# Patient Record
Sex: FEMALE | Race: WHITE | NOT HISPANIC OR LATINO | URBAN - NONMETROPOLITAN AREA
[De-identification: names, ages, dates, MRNs, and addresses within clinical notes are randomized per-mention and may not be internally consistent; named-entity substitution may affect disease eponyms.]

---

## 2017-01-18 ENCOUNTER — OFFICE (OUTPATIENT)
Dept: URBAN - NONMETROPOLITAN AREA CLINIC 13 | Facility: CLINIC | Age: 62
End: 2017-01-18
Payer: COMMERCIAL

## 2017-01-18 VITALS
WEIGHT: 179 LBS | DIASTOLIC BLOOD PRESSURE: 79 MMHG | HEIGHT: 65 IN | SYSTOLIC BLOOD PRESSURE: 134 MMHG | HEART RATE: 74 BPM

## 2017-01-18 VITALS — HEIGHT: 65 IN

## 2017-01-18 DIAGNOSIS — K21.9 GASTRO-ESOPHAGEAL REFLUX DISEASE WITHOUT ESOPHAGITIS: ICD-10-CM

## 2017-01-18 DIAGNOSIS — R10.30 LOWER ABDOMINAL PAIN, UNSPECIFIED: ICD-10-CM

## 2017-01-18 DIAGNOSIS — K74.60 UNSPECIFIED CIRRHOSIS OF LIVER: ICD-10-CM

## 2017-01-18 DIAGNOSIS — R10.2 PELVIC AND PERINEAL PAIN: ICD-10-CM

## 2017-01-18 DIAGNOSIS — D64.9 ANEMIA, UNSPECIFIED: ICD-10-CM

## 2017-01-18 DIAGNOSIS — B18.2 CHRONIC VIRAL HEPATITIS C: ICD-10-CM

## 2017-01-18 PROCEDURE — 85025 COMPLETE CBC W/AUTO DIFF WBC: CPT

## 2017-01-18 PROCEDURE — 99213 OFFICE O/P EST LOW 20 MIN: CPT | Mod: 25

## 2017-01-18 PROCEDURE — 76856 US EXAM PELVIC COMPLETE: CPT

## 2017-05-04 ENCOUNTER — OFFICE (OUTPATIENT)
Dept: URBAN - NONMETROPOLITAN AREA CLINIC 13 | Facility: CLINIC | Age: 62
End: 2017-05-04
Payer: COMMERCIAL

## 2017-05-04 VITALS
HEIGHT: 65 IN | HEART RATE: 74 BPM | SYSTOLIC BLOOD PRESSURE: 124 MMHG | WEIGHT: 167 LBS | DIASTOLIC BLOOD PRESSURE: 76 MMHG

## 2017-05-04 VITALS — HEIGHT: 65 IN

## 2017-05-04 DIAGNOSIS — R19.4 CHANGE IN BOWEL HABIT: ICD-10-CM

## 2017-05-04 DIAGNOSIS — K22.70 BARRETT'S ESOPHAGUS WITHOUT DYSPLASIA: ICD-10-CM

## 2017-05-04 DIAGNOSIS — K21.9 GASTRO-ESOPHAGEAL REFLUX DISEASE WITHOUT ESOPHAGITIS: ICD-10-CM

## 2017-05-04 DIAGNOSIS — B18.2 CHRONIC VIRAL HEPATITIS C: ICD-10-CM

## 2017-05-04 DIAGNOSIS — D50.9 IRON DEFICIENCY ANEMIA, UNSPECIFIED: ICD-10-CM

## 2017-05-04 DIAGNOSIS — R10.13 EPIGASTRIC PAIN: ICD-10-CM

## 2017-05-04 DIAGNOSIS — R11.0 NAUSEA: ICD-10-CM

## 2017-05-04 DIAGNOSIS — K74.69 OTHER CIRRHOSIS OF LIVER: ICD-10-CM

## 2017-05-04 LAB
AMYLASE: 69 U/L (ref 25–125)
CBC EMERALD: HEMATOCRIT: 42.2 % (ref 37–47)
CBC EMERALD: HEMOGLOBIN: 14.6 G/DL — HIGH (ref 12–14)
CBC EMERALD: LYMPHS %: 29.4 % (ref 20.5–40.5)
CBC EMERALD: LYMPHS ABSOLUTE: 1.8 10*3U/L (ref 1.3–2.9)
CBC EMERALD: MCH: 32.5 PG — HIGH (ref 27–32)
CBC EMERALD: MCHC: 34.6 G/DL (ref 32–35)
CBC EMERALD: MCV: 94 FL (ref 84–100)
CBC EMERALD: MONOS %: 8.3 % (ref 2–10)
CBC EMERALD: MONOS ABSOLUTE: 0.5 10*3U/L (ref 0.3–3.8)
CBC EMERALD: MPV: 11.8 FL — HIGH (ref 7.4–10.4)
CBC EMERALD: NEUT. %: 62.3 % (ref 43–65)
CBC EMERALD: NEUT. ABSOLUTE: 3.8 10*3U/L (ref 2.2–4.8)
CBC EMERALD: PLATELETS: 103 10*3U/L — LOW (ref 130–440)
CBC EMERALD: RBC: 4.5 10*6U/L (ref 4.2–5.4)
CBC EMERALD: RDW: 14.2 % (ref 11.5–15.5)
CBC EMERALD: WBC: 6.1 10*3U/L (ref 4.5–10.5)
LIPASE: 25 U/L (ref 8–78)

## 2017-05-04 PROCEDURE — 82150 ASSAY OF AMYLASE: CPT

## 2017-05-04 PROCEDURE — 99214 OFFICE O/P EST MOD 30 MIN: CPT | Mod: 25

## 2017-05-04 PROCEDURE — 85025 COMPLETE CBC W/AUTO DIFF WBC: CPT

## 2017-05-04 PROCEDURE — 76700 US EXAM ABDOM COMPLETE: CPT

## 2017-05-04 PROCEDURE — 83690 ASSAY OF LIPASE: CPT

## 2017-05-04 PROCEDURE — 36415 COLL VENOUS BLD VENIPUNCTURE: CPT

## 2017-05-04 RX ORDER — ONDANSETRON HYDROCHLORIDE 4 MG/1
TABLET, FILM COATED ORAL
Qty: 30 | Refills: 0 | Status: ACTIVE
Start: 2017-05-04

## 2017-05-04 RX ORDER — POLYETHYLENE GLYCOL 3350 17 G/17G
POWDER, FOR SOLUTION ORAL
Qty: 1054 | Refills: 5 | Status: ACTIVE
Start: 2017-05-04

## 2017-10-31 ENCOUNTER — OFFICE (OUTPATIENT)
Dept: URBAN - NONMETROPOLITAN AREA CLINIC 13 | Facility: CLINIC | Age: 62
End: 2017-10-31
Payer: COMMERCIAL

## 2017-10-31 VITALS
HEIGHT: 65 IN | HEART RATE: 89 BPM | DIASTOLIC BLOOD PRESSURE: 89 MMHG | SYSTOLIC BLOOD PRESSURE: 158 MMHG | WEIGHT: 179 LBS

## 2017-10-31 VITALS — HEIGHT: 65 IN

## 2017-10-31 DIAGNOSIS — K21.9 GASTRO-ESOPHAGEAL REFLUX DISEASE WITHOUT ESOPHAGITIS: ICD-10-CM

## 2017-10-31 DIAGNOSIS — R19.7 DIARRHEA, UNSPECIFIED: ICD-10-CM

## 2017-10-31 DIAGNOSIS — B18.2 CHRONIC VIRAL HEPATITIS C: ICD-10-CM

## 2017-10-31 DIAGNOSIS — K74.60 UNSPECIFIED CIRRHOSIS OF LIVER: ICD-10-CM

## 2017-10-31 LAB
CBC EMERALD: HEMATOCRIT: 37.9 % (ref 37–47)
CBC EMERALD: HEMOGLOBIN: 12.5 G/DL (ref 12–14)
CBC EMERALD: LYMPHS %: 28.8 % (ref 20.5–40.5)
CBC EMERALD: LYMPHS ABSOLUTE: 1.4 10*3U/L (ref 1.3–2.9)
CBC EMERALD: MCH: 30.3 PG (ref 27–32)
CBC EMERALD: MCHC: 33 G/DL (ref 28.5–35)
CBC EMERALD: MCV: 92 FL (ref 84–100)
CBC EMERALD: MONOS %: 7.9 % (ref 2–10)
CBC EMERALD: MONOS ABSOLUTE: 0.4 10*3U/L (ref 0.3–3.8)
CBC EMERALD: MPV: 9.9 FL (ref 7.4–10.4)
CBC EMERALD: NEUT. %: 63.3 % (ref 43–65)
CBC EMERALD: NEUT. ABSOLUTE: 2.9 10*3U/L (ref 2.2–4.8)
CBC EMERALD: PLATELETS: 147 10*3U/L (ref 130–440)
CBC EMERALD: RBC: 4.1 10*6U/L — LOW (ref 4.2–5.4)
CBC EMERALD: RDW: 14.3 % (ref 11.5–15.5)
CBC EMERALD: WBC: 4.7 10*3U/L (ref 4.5–10.5)

## 2017-10-31 PROCEDURE — 85025 COMPLETE CBC W/AUTO DIFF WBC: CPT | Performed by: NURSE PRACTITIONER

## 2017-10-31 PROCEDURE — 99213 OFFICE O/P EST LOW 20 MIN: CPT | Performed by: NURSE PRACTITIONER

## 2017-10-31 RX ORDER — RIFAXIMIN 550 MG/1
TABLET ORAL
Qty: 42 | Refills: 0 | Status: COMPLETED
Start: 2017-10-31 | End: 2017-11-02

## 2017-11-13 ENCOUNTER — AMBULATORY SURGICAL CENTER (OUTPATIENT)
Dept: URBAN - NONMETROPOLITAN AREA SURGERY 2 | Facility: SURGERY | Age: 62
End: 2017-11-13
Payer: COMMERCIAL

## 2017-11-13 VITALS
HEART RATE: 82 BPM | RESPIRATION RATE: 20 BRPM | HEART RATE: 88 BPM | SYSTOLIC BLOOD PRESSURE: 124 MMHG | RESPIRATION RATE: 18 BRPM | SYSTOLIC BLOOD PRESSURE: 135 MMHG | OXYGEN SATURATION: 94 % | DIASTOLIC BLOOD PRESSURE: 81 MMHG | SYSTOLIC BLOOD PRESSURE: 137 MMHG | SYSTOLIC BLOOD PRESSURE: 147 MMHG | HEIGHT: 65 IN | HEART RATE: 81 BPM | OXYGEN SATURATION: 93 % | OXYGEN SATURATION: 96 % | DIASTOLIC BLOOD PRESSURE: 76 MMHG | WEIGHT: 179 LBS | DIASTOLIC BLOOD PRESSURE: 72 MMHG | DIASTOLIC BLOOD PRESSURE: 74 MMHG | HEART RATE: 83 BPM

## 2017-11-13 DIAGNOSIS — R13.10 DYSPHAGIA, UNSPECIFIED: ICD-10-CM

## 2017-11-13 DIAGNOSIS — K74.60 UNSPECIFIED CIRRHOSIS OF LIVER: ICD-10-CM

## 2017-11-13 DIAGNOSIS — R10.13 EPIGASTRIC PAIN: ICD-10-CM

## 2017-11-13 DIAGNOSIS — K29.70 GASTRITIS, UNSPECIFIED, WITHOUT BLEEDING: ICD-10-CM

## 2017-11-13 DIAGNOSIS — K22.70 BARRETT'S ESOPHAGUS WITHOUT DYSPLASIA: ICD-10-CM

## 2017-11-13 PROCEDURE — 43239 EGD BIOPSY SINGLE/MULTIPLE: CPT | Performed by: INTERNAL MEDICINE

## 2017-11-13 PROCEDURE — 43248 EGD GUIDE WIRE INSERTION: CPT | Performed by: INTERNAL MEDICINE

## 2017-11-13 PROCEDURE — 00740: CPT | Mod: QS,QZ

## 2017-11-13 RX ORDER — RANITIDINE HYDROCHLORIDE 300 MG/1
TABLET, FILM COATED ORAL
Qty: 180 | Refills: 1 | Status: ACTIVE

## 2017-11-13 RX ORDER — DIPHENOXYLATE HYDROCHLORIDE AND ATROPINE SULFATE 2.5; .025 MG/1; MG/1
5 TABLET ORAL
Qty: 20 | Refills: 0 | Status: COMPLETED
Start: 2017-11-02 | End: 2017-11-13

## 2017-11-13 RX ORDER — PANTOPRAZOLE SODIUM 40 MG/1
TABLET, DELAYED RELEASE ORAL
Qty: 90 | Refills: 1 | Status: COMPLETED
End: 2017-12-12

## 2017-11-13 RX ORDER — ESOMEPRAZOLE MAGNESIUM 40 MG/1
CAPSULE, DELAYED RELEASE ORAL
Qty: 90 | Refills: 1 | Status: COMPLETED
Start: 2016-10-20 | End: 2017-11-13

## 2017-11-14 ENCOUNTER — OFFICE (OUTPATIENT)
Dept: URBAN - NONMETROPOLITAN AREA CLINIC 13 | Facility: CLINIC | Age: 62
End: 2017-11-14
Payer: COMMERCIAL

## 2017-11-14 VITALS — HEIGHT: 65 IN

## 2017-11-14 DIAGNOSIS — K74.60 UNSPECIFIED CIRRHOSIS OF LIVER: ICD-10-CM

## 2017-11-14 DIAGNOSIS — Z79.01 LONG TERM (CURRENT) USE OF ANTICOAGULANTS: ICD-10-CM

## 2017-11-14 PROCEDURE — 85610 PROTHROMBIN TIME: CPT | Performed by: NURSE PRACTITIONER

## 2017-11-15 LAB — SURGICAL PROCEDURE: PDF REPORT: (no result)

## 2017-12-12 ENCOUNTER — OFFICE (OUTPATIENT)
Dept: URBAN - NONMETROPOLITAN AREA CLINIC 13 | Facility: CLINIC | Age: 62
End: 2017-12-12
Payer: COMMERCIAL

## 2017-12-12 VITALS
DIASTOLIC BLOOD PRESSURE: 77 MMHG | WEIGHT: 176 LBS | HEIGHT: 65 IN | SYSTOLIC BLOOD PRESSURE: 128 MMHG | HEART RATE: 75 BPM

## 2017-12-12 VITALS — HEIGHT: 65 IN

## 2017-12-12 DIAGNOSIS — R10.13 EPIGASTRIC PAIN: ICD-10-CM

## 2017-12-12 DIAGNOSIS — K74.60 UNSPECIFIED CIRRHOSIS OF LIVER: ICD-10-CM

## 2017-12-12 DIAGNOSIS — R10.12 LEFT UPPER QUADRANT PAIN: ICD-10-CM

## 2017-12-12 DIAGNOSIS — R10.11 RIGHT UPPER QUADRANT PAIN: ICD-10-CM

## 2017-12-12 LAB
AMYLASE: 60.2 U/L (ref 25–125)
CBC SYSMEX: HEMATOCRIT: 39.4 % (ref 37–47)
CBC SYSMEX: HEMOGLOBIN: 13 G/DL (ref 12–14)
CBC SYSMEX: LYMPHS %: 32.5 % (ref 20.5–40.5)
CBC SYSMEX: LYMPHS ABSOLUTE: 1.8 10*3U/L (ref 1.3–2.9)
CBC SYSMEX: MCH: 29.9 PG (ref 27–32)
CBC SYSMEX: MCHC: 33 G/DL (ref 28.5–35)
CBC SYSMEX: MCV: 90.6 FL (ref 84–100)
CBC SYSMEX: MONOS %: 7 % (ref 2–10)
CBC SYSMEX: MONOS ABSOLUTE: 0.4 10*3U/L (ref 0.3–3.8)
CBC SYSMEX: MPV: 11.2 FL — HIGH (ref 7.4–10.4)
CBC SYSMEX: NEUT. %: 60.5 % (ref 43–65)
CBC SYSMEX: NEUT. ABSOLUTE: 3.2 10*3U/L (ref 2.2–4.8)
CBC SYSMEX: PLATELETS: 132 10*3U/L (ref 130–440)
CBC SYSMEX: RBC: 4.4 10*6U/L (ref 4.2–5.4)
CBC SYSMEX: RDW: 15 % (ref 11.5–15.5)
CBC SYSMEX: WBC: 5.4 10*3U/L (ref 4.5–10.5)
LIPASE: 15 U/L (ref 8–78)

## 2017-12-12 PROCEDURE — 76700 US EXAM ABDOM COMPLETE: CPT | Performed by: NURSE PRACTITIONER

## 2017-12-12 PROCEDURE — 83690 ASSAY OF LIPASE: CPT | Performed by: NURSE PRACTITIONER

## 2017-12-12 PROCEDURE — 82150 ASSAY OF AMYLASE: CPT | Performed by: NURSE PRACTITIONER

## 2017-12-12 PROCEDURE — 99213 OFFICE O/P EST LOW 20 MIN: CPT | Mod: 25 | Performed by: NURSE PRACTITIONER

## 2017-12-12 PROCEDURE — 85025 COMPLETE CBC W/AUTO DIFF WBC: CPT | Performed by: NURSE PRACTITIONER

## 2017-12-12 RX ORDER — PANTOPRAZOLE SODIUM 40 MG/1
TABLET, DELAYED RELEASE ORAL
Qty: 90 | Refills: 1 | Status: COMPLETED
End: 2017-12-12

## 2017-12-12 RX ORDER — ESOMEPRAZOLE MAGNESIUM 40 MG/1
CAPSULE, DELAYED RELEASE ORAL
Qty: 30 | Refills: 5 | Status: COMPLETED
Start: 2017-12-12 | End: 2017-12-28

## 2017-12-12 RX ORDER — RANITIDINE HYDROCHLORIDE 300 MG/1
TABLET, FILM COATED ORAL
Qty: 180 | Refills: 1 | Status: ACTIVE

## 2018-01-05 ENCOUNTER — OFFICE (OUTPATIENT)
Dept: URBAN - NONMETROPOLITAN AREA CLINIC 13 | Facility: CLINIC | Age: 63
End: 2018-01-05
Payer: COMMERCIAL

## 2018-01-05 VITALS
SYSTOLIC BLOOD PRESSURE: 134 MMHG | HEIGHT: 65 IN | HEART RATE: 67 BPM | WEIGHT: 175 LBS | DIASTOLIC BLOOD PRESSURE: 88 MMHG

## 2018-01-05 DIAGNOSIS — R11.0 NAUSEA: ICD-10-CM

## 2018-01-05 DIAGNOSIS — B18.2 CHRONIC VIRAL HEPATITIS C: ICD-10-CM

## 2018-01-05 DIAGNOSIS — K74.60 UNSPECIFIED CIRRHOSIS OF LIVER: ICD-10-CM

## 2018-01-05 DIAGNOSIS — R10.13 EPIGASTRIC PAIN: ICD-10-CM

## 2018-01-05 PROCEDURE — 99213 OFFICE O/P EST LOW 20 MIN: CPT | Performed by: NURSE PRACTITIONER

## 2018-01-05 RX ORDER — RANITIDINE HYDROCHLORIDE 300 MG/1
TABLET, FILM COATED ORAL
Qty: 180 | Refills: 1 | Status: ACTIVE

## 2018-01-05 RX ORDER — DEXLANSOPRAZOLE 60 MG/1
CAPSULE, DELAYED RELEASE ORAL
Qty: 30 | Refills: 5 | Status: ACTIVE
Start: 2017-12-28

## 2018-01-05 RX ORDER — SUCRALFATE 1 G/1
TABLET ORAL
Qty: 120 | Refills: 2 | Status: ACTIVE
Start: 2018-01-05

## 2021-11-11 PROBLEM — G43.709 CHRONIC MIGRAINE WITHOUT AURA: Chronic | Status: CHRONIC | Noted: 2021-11-11

## 2022-02-25 PROBLEM — Z87.11 H/O PEPTIC ULCER: Chronic | Status: CHRONIC | Noted: 2022-02-25

## 2022-02-25 PROBLEM — J44.9 CHRONIC OBSTRUCTIVE PULMONARY DISEASE (CMS/HCC): Chronic | Status: CHRONIC | Noted: 2022-02-25

## 2022-02-25 PROBLEM — F17.200 SMOKER: Chronic | Status: CHRONIC | Noted: 2022-02-25

## 2022-09-27 PROBLEM — M19.90 ARTHRITIS: Chronic | Status: CHRONIC | Noted: 2021-11-11

## 2022-09-27 PROBLEM — I10 HYPERTENSION: Chronic | Status: CHRONIC | Noted: 2021-11-11

## 2022-09-27 PROBLEM — F32.A DEPRESSIVE DISORDER: Chronic | Status: CHRONIC | Noted: 2022-09-27

## 2022-09-27 PROBLEM — F41.9 ANXIETY: Chronic | Status: CHRONIC | Noted: 2022-02-25

## 2022-09-27 PROBLEM — K21.9 GASTROESOPHAGEAL REFLUX DISEASE: Chronic | Status: CHRONIC | Noted: 2022-09-27

## 2022-09-27 PROBLEM — G43.909 MIGRAINE HEADACHE: Chronic | Status: CHRONIC | Noted: 2022-09-27

## 2022-12-14 PROBLEM — R63.0 ANOREXIA: Chronic | Status: CHRONIC | Noted: 2022-12-14

## 2023-04-25 ENCOUNTER — OFFICE (OUTPATIENT)
Dept: URBAN - NONMETROPOLITAN AREA CLINIC 1 | Facility: CLINIC | Age: 68
End: 2023-04-25
Payer: COMMERCIAL

## 2023-04-25 VITALS
DIASTOLIC BLOOD PRESSURE: 76 MMHG | HEIGHT: 66 IN | SYSTOLIC BLOOD PRESSURE: 124 MMHG | WEIGHT: 141 LBS | HEART RATE: 95 BPM

## 2023-04-25 DIAGNOSIS — F41.8 OTHER SPECIFIED ANXIETY DISORDERS: ICD-10-CM

## 2023-04-25 DIAGNOSIS — K22.70 BARRETT'S ESOPHAGUS WITHOUT DYSPLASIA: ICD-10-CM

## 2023-04-25 DIAGNOSIS — K74.60 UNSPECIFIED CIRRHOSIS OF LIVER: ICD-10-CM

## 2023-04-25 DIAGNOSIS — R11.0 NAUSEA: ICD-10-CM

## 2023-04-25 DIAGNOSIS — K21.9 GASTRO-ESOPHAGEAL REFLUX DISEASE WITHOUT ESOPHAGITIS: ICD-10-CM

## 2023-04-25 DIAGNOSIS — I10 ESSENTIAL (PRIMARY) HYPERTENSION: ICD-10-CM

## 2023-04-25 PROCEDURE — 99214 OFFICE O/P EST MOD 30 MIN: CPT | Performed by: NURSE PRACTITIONER

## 2023-04-25 NOTE — SERVICEHPINOTES
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 She rarely has any trouble with heartburn, is not taking anything for acid reduction. She uses Phenergan for intermittent bouts of nausea, prescribed by her PCP.   Wants a refill today. She has a history of hepatitis C and cirrhosis. She sees a hepatologist at [facility].  Her chronic illnesses include hypertension, anxiety/depression, arthritis.br
br Her last EGD was done 7/1/21 by Dr. Jones-  Recommended repeat in 3 yearsbr Findings:brEsophagus: Evidence of Cannon's appearing mucosa (C0M1) 3 forceps biopsies were taken from this location. The esophagus otherwise appears normal without evidence of active reflux induced changes, ulceration, mass, stricture, or evidence of esophageal varices.	brGastroesophageal junction: Gastroesophageal junction at 39 centimeters with a 1 to 2 centimeter sliding hiatal hernia.brStomach: Multiple small clean based ulcerations within the mid antral region. The tissue surrounding this was edematous and erythematous. There were linear ulcerations within the more proximal antrum and distal gastric body. All clean based. Forcep biopsies were taken from the antrum, incisura, and distal gastric body. There was no evidence of mass or gastric varices.brPylorus: Normal. Patent.brDuodenum: Inflammatory appearance to the duodenal mucosa with some small linear type ulcerations in the proximal 3rd portion of the duodenum. Biopsies were taken from the duodenum.
br
br   Final Pathologic Diagnosis:br   1.   DUODENUM, ENDOSCOPIC BIOPSY:br     Benign fragments of duodenal mucosa with features of mild nonspecific reactive duodenitis. br   2.   stomach, endoscopic biopsy:br     Benign fragments of gastric mucosa exhibiting features of marked chemical/reactive gastropathy, a phase of acute erosive gastritis. br   Immunohistochemical stain for Helicobacter organisms is negative. br   3.   ESOPHAGOGASTRIC JUNCTION, ENDOSCOPIC BIOPSY:br     Benign fragments of esophagogastric junctional mucosa demonstrating features of columnar-liked epithelium of the specialized intestinal type, diagnostic of Cannon's esophagus. br   Negative for dysplasia.

## 2023-04-25 NOTE — SERVICENOTES
Risks for EGD were discussed and she agrees to proceed.  I gave her 30 tablets of Phenergan, but she knows that I will not continue to refill it, as we will only be seeing her every 3 years.

## 2023-09-27 ENCOUNTER — ON CAMPUS - OUTPATIENT (OUTPATIENT)
Dept: URBAN - NONMETROPOLITAN AREA HOSPITAL 34 | Facility: HOSPITAL | Age: 68
End: 2023-09-27
Payer: COMMERCIAL

## 2023-09-27 DIAGNOSIS — K22.70 BARRETT'S ESOPHAGUS WITHOUT DYSPLASIA: ICD-10-CM

## 2023-09-27 DIAGNOSIS — K74.60 UNSPECIFIED CIRRHOSIS OF LIVER: ICD-10-CM

## 2023-09-27 PROCEDURE — 43239 EGD BIOPSY SINGLE/MULTIPLE: CPT | Performed by: INTERNAL MEDICINE
